# Patient Record
Sex: FEMALE | Race: WHITE | NOT HISPANIC OR LATINO
[De-identification: names, ages, dates, MRNs, and addresses within clinical notes are randomized per-mention and may not be internally consistent; named-entity substitution may affect disease eponyms.]

---

## 2017-02-26 ENCOUNTER — APPOINTMENT (OUTPATIENT)
Dept: MRI IMAGING | Facility: HOSPITAL | Age: 72
End: 2017-02-26

## 2017-02-26 ENCOUNTER — OUTPATIENT (OUTPATIENT)
Dept: OUTPATIENT SERVICES | Facility: HOSPITAL | Age: 72
LOS: 1 days | End: 2017-02-26
Payer: COMMERCIAL

## 2017-02-26 DIAGNOSIS — S32.050S: ICD-10-CM

## 2017-02-26 PROCEDURE — 72148 MRI LUMBAR SPINE W/O DYE: CPT | Mod: 26

## 2017-02-26 PROCEDURE — 72148 MRI LUMBAR SPINE W/O DYE: CPT

## 2017-09-26 ENCOUNTER — RESULT REVIEW (OUTPATIENT)
Age: 72
End: 2017-09-26

## 2019-07-24 ENCOUNTER — RX RENEWAL (OUTPATIENT)
Age: 74
End: 2019-07-24

## 2019-07-24 ENCOUNTER — APPOINTMENT (OUTPATIENT)
Dept: UROLOGY | Facility: CLINIC | Age: 74
End: 2019-07-24
Payer: MEDICARE

## 2019-07-24 VITALS
WEIGHT: 164 LBS | HEIGHT: 58 IN | TEMPERATURE: 97 F | HEART RATE: 68 BPM | DIASTOLIC BLOOD PRESSURE: 87 MMHG | SYSTOLIC BLOOD PRESSURE: 151 MMHG | BODY MASS INDEX: 34.43 KG/M2

## 2019-07-24 PROCEDURE — 99204 OFFICE O/P NEW MOD 45 MIN: CPT

## 2019-08-07 NOTE — HISTORY OF PRESENT ILLNESS
[FreeTextEntry1] : cc frequent urination\par 72 yo fem with logstanding frequent urination \par no dysuria \par u aneg \par 2 cups coffee \par on oxybutinin 10 mg

## 2019-08-07 NOTE — ASSESSMENT
[FreeTextEntry1] : will increase oxybutinin to 15 mg \par reduce caffeine intake \par f/u for urodynamics

## 2019-08-08 ENCOUNTER — APPOINTMENT (OUTPATIENT)
Dept: UROLOGY | Facility: CLINIC | Age: 74
End: 2019-08-08
Payer: MEDICARE

## 2019-08-08 VITALS
SYSTOLIC BLOOD PRESSURE: 154 MMHG | RESPIRATION RATE: 15 BRPM | WEIGHT: 164 LBS | HEART RATE: 74 BPM | BODY MASS INDEX: 34.43 KG/M2 | HEIGHT: 58 IN | DIASTOLIC BLOOD PRESSURE: 75 MMHG

## 2019-08-08 DIAGNOSIS — R35.0 FREQUENCY OF MICTURITION: ICD-10-CM

## 2019-08-08 PROCEDURE — 51741 ELECTRO-UROFLOWMETRY FIRST: CPT

## 2019-08-08 PROCEDURE — 51784 ANAL/URINARY MUSCLE STUDY: CPT

## 2019-08-08 PROCEDURE — 51797 INTRAABDOMINAL PRESSURE TEST: CPT

## 2019-08-08 PROCEDURE — 51728 CYSTOMETROGRAM W/VP: CPT

## 2019-08-27 PROBLEM — R35.0 FREQUENCY OF URINATION: Status: ACTIVE | Noted: 2019-07-24

## 2019-10-21 ENCOUNTER — RX RENEWAL (OUTPATIENT)
Age: 74
End: 2019-10-21

## 2019-11-05 ENCOUNTER — RX RENEWAL (OUTPATIENT)
Age: 74
End: 2019-11-05

## 2019-11-05 RX ORDER — MIRABEGRON 25 MG/1
25 TABLET, FILM COATED, EXTENDED RELEASE ORAL
Refills: 0 | Status: ACTIVE | COMMUNITY

## 2019-12-17 ENCOUNTER — RX RENEWAL (OUTPATIENT)
Age: 74
End: 2019-12-17

## 2020-01-10 ENCOUNTER — RX RENEWAL (OUTPATIENT)
Age: 75
End: 2020-01-10

## 2020-02-03 ENCOUNTER — RX RENEWAL (OUTPATIENT)
Age: 75
End: 2020-02-03

## 2020-03-16 ENCOUNTER — RX RENEWAL (OUTPATIENT)
Age: 75
End: 2020-03-16

## 2020-03-16 RX ORDER — OXYBUTYNIN CHLORIDE 15 MG/1
15 TABLET, EXTENDED RELEASE ORAL
Qty: 30 | Refills: 0 | Status: ACTIVE | COMMUNITY
Start: 2019-07-24 | End: 1900-01-01

## 2020-05-04 ENCOUNTER — APPOINTMENT (OUTPATIENT)
Dept: NEUROLOGY | Facility: CLINIC | Age: 75
End: 2020-05-04
Payer: MEDICARE

## 2020-05-04 DIAGNOSIS — Z81.8 FAMILY HISTORY OF OTHER MENTAL AND BEHAVIORAL DISORDERS: ICD-10-CM

## 2020-05-04 DIAGNOSIS — R41.3 OTHER AMNESIA: ICD-10-CM

## 2020-05-04 PROCEDURE — 99205 OFFICE O/P NEW HI 60 MIN: CPT | Mod: 95

## 2020-05-04 RX ORDER — DONEPEZIL HYDROCHLORIDE 5 MG/1
5 TABLET ORAL
Qty: 30 | Refills: 5 | Status: ACTIVE | COMMUNITY
Start: 2020-05-04 | End: 1900-01-01

## 2020-05-04 NOTE — PHYSICAL EXAM
[General Appearance - Alert] : alert [General Appearance - In No Acute Distress] : in no acute distress [Person] : oriented to person [Place] : oriented to place [Time] : oriented to time [Registration Intact] : recent registration memory intact [Visual Intact] : visual attention was ~T not ~L decreased [Concentration Intact] : normal concentrating ability [Naming Objects] : no difficulty naming common objects [Fluency] : fluency intact [Comprehension] : comprehension intact [Vocabulary] : adequate range of vocabulary [Cranial Nerves Optic (II)] : visual acuity intact bilaterally,  visual fields full to confrontation, pupils equal round and reactive to light [Cranial Nerves Oculomotor (III)] : extraocular motion intact [Cranial Nerves Trigeminal (V)] : facial sensation intact symmetrically [Cranial Nerves Vestibulocochlear (VIII)] : hearing was intact bilaterally [Cranial Nerves Facial (VII)] : face symmetrical [Cranial Nerves Glossopharyngeal (IX)] : tongue and palate midline [Cranial Nerves Accessory (XI - Cranial And Spinal)] : head turning and shoulder shrug symmetric [Cranial Nerves Hypoglossal (XII)] : there was no tongue deviation with protrusion [Motor Tone] : muscle tone was normal in all four extremities [Motor Strength] : muscle strength was normal in all four extremities [Involuntary Movements] : no involuntary movements were seen [Sensation Tactile Decrease] : light touch was intact [Abnormal Walk] : normal gait [Balance] : balance was intact [] : no respiratory distress [Remote Intact] : remote memory impaired [Coordination - Dysmetria Impaired Heel-to-Shin Bilateral] : not present [Coordination - Dysmetria Impaired Finger-to-Nose Bilateral] : not present [FreeTextEntry5] : fundi not visualized

## 2020-05-04 NOTE — DATA REVIEWED
[de-identified] : urology and neurosurgery note appreciated\par papsmear -joya 2017\par PVR normal

## 2020-05-04 NOTE — REVIEW OF SYSTEMS
[As Noted in HPI] : as noted in HPI [Loss Of Hearing] : hearing loss [Joint Pain] : joint pain [Fever] : no fever [Anxiety] : no anxiety [Depression] : no depression [Eyesight Problems] : no eyesight problems [Cough] : no cough [Chest Pain] : no chest pain [Vomiting] : no vomiting [Incontinence] : no incontinence [Skin Lesions] : no skin lesions [Muscle Weakness] : no muscle weakness [Easy Bleeding] : no tendency for easy bleeding

## 2020-05-04 NOTE — CONSULT LETTER
[Dear  ___] : Dear  [unfilled], [Consult Letter:] : I had the pleasure of evaluating your patient, [unfilled]. [Please see my note below.] : Please see my note below. [Consult Closing:] : Thank you very much for allowing me to participate in the care of this patient.  If you have any questions, please do not hesitate to contact me. [Sincerely,] : Sincerely, [FreeTextEntry3] : Paola Lindsey MD, MPH\par

## 2020-05-04 NOTE — HISTORY OF PRESENT ILLNESS
[Patient] : the patient [Other Location: e.g. Home (Enter Location, City,State)___] : at [unfilled] [Home] : at home, [unfilled] , at the time of the visit. [FreeTextEntry4] : [place] [FreeTextEntry1] : The patient has HTN and is evaluated for changes in memory that started 1 year ago.  The patient has difficulty remembering small things and numbers.  SHe does not have difficulty with eating, grooming, cleaning or shopping.  She does not get lost.  Her  took over doing the finances about 1.5 years ago as she was making mistakes.  \par \par No focal signs of a stroke.  No bowel or bladder problems or difficulty with ambulation.  NO problems with the mood. Sister has dementia Dx at age 82.\par \par The patient was started on Donepazil 5mg last week by her PMD.

## 2020-05-04 NOTE — ASSESSMENT
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  For now, will cw Donepazil 5ng daily.  Will consider adjusting the medication at next visit.

## 2020-05-06 ENCOUNTER — RX RENEWAL (OUTPATIENT)
Age: 75
End: 2020-05-06

## 2020-05-26 ENCOUNTER — RX RENEWAL (OUTPATIENT)
Age: 75
End: 2020-05-26

## 2020-06-11 ENCOUNTER — RX RENEWAL (OUTPATIENT)
Age: 75
End: 2020-06-11

## 2020-06-16 ENCOUNTER — APPOINTMENT (OUTPATIENT)
Dept: NEUROLOGY | Facility: CLINIC | Age: 75
End: 2020-06-16

## 2020-06-29 ENCOUNTER — APPOINTMENT (OUTPATIENT)
Dept: MRI IMAGING | Facility: HOSPITAL | Age: 75
End: 2020-06-29
Payer: MEDICARE

## 2020-06-29 ENCOUNTER — OUTPATIENT (OUTPATIENT)
Dept: OUTPATIENT SERVICES | Facility: HOSPITAL | Age: 75
LOS: 1 days | End: 2020-06-29
Payer: COMMERCIAL

## 2020-06-29 DIAGNOSIS — R41.3 OTHER AMNESIA: ICD-10-CM

## 2020-06-29 PROCEDURE — 70544 MR ANGIOGRAPHY HEAD W/O DYE: CPT | Mod: 26

## 2020-06-29 PROCEDURE — 70544 MR ANGIOGRAPHY HEAD W/O DYE: CPT
